# Patient Record
Sex: MALE | Race: BLACK OR AFRICAN AMERICAN | URBAN - METROPOLITAN AREA
[De-identification: names, ages, dates, MRNs, and addresses within clinical notes are randomized per-mention and may not be internally consistent; named-entity substitution may affect disease eponyms.]

---

## 2017-06-02 ENCOUNTER — OFFICE VISIT (OUTPATIENT)
Dept: FAMILY MEDICINE | Facility: CLINIC | Age: 22
End: 2017-06-02
Payer: COMMERCIAL

## 2017-06-02 VITALS
HEART RATE: 107 BPM | BODY MASS INDEX: 23.1 KG/M2 | SYSTOLIC BLOOD PRESSURE: 124 MMHG | WEIGHT: 180 LBS | OXYGEN SATURATION: 96 % | HEIGHT: 74 IN | DIASTOLIC BLOOD PRESSURE: 75 MMHG | TEMPERATURE: 97.9 F

## 2017-06-02 DIAGNOSIS — M67.40 GANGLION CYST: Primary | ICD-10-CM

## 2017-06-02 PROCEDURE — 99213 OFFICE O/P EST LOW 20 MIN: CPT | Performed by: NURSE PRACTITIONER

## 2017-06-02 NOTE — PROGRESS NOTES
"  SUBJECTIVE:                                                    Ismael Hussein is a 22 year old male who presents to clinic today for the following health issues:      Joint Pain/ knot on hand      Onset: 3-4 months     Description:   Location:dorsum  left wrist  Character: Sharp    Intensity: mild    Progression of Symptoms: better    Accompanying Signs & Symptoms:  Other symptoms: swelling   History:   Previous similar pain: no       Precipitating factors:   Trauma or overuse: no     Alleviating factors:  Improved by: nothing       Therapies Tried and outcome: None      Problem list and histories reviewed & adjusted, as indicated.  Additional history: as documented    BP Readings from Last 3 Encounters:   06/02/17 124/75   03/07/16 131/78   09/30/15 119/74    Wt Readings from Last 3 Encounters:   06/06/17 180 lb (81.6 kg)   06/02/17 180 lb (81.6 kg)   09/10/15 175 lb 4.8 oz (79.5 kg) (82 %)*     * Growth percentiles are based on CDC 2-20 Years data.                  Labs reviewed in EPIC    Reviewed and updated as needed this visit by clinical staff       Reviewed and updated as needed this visit by Provider         ROS:  Constitutional, HEENT, cardiovascular, pulmonary, gi and gu systems are negative, except as otherwise noted.    OBJECTIVE:                                                    /75 (BP Location: Left arm, Patient Position: Chair, Cuff Size: Adult Regular)  Pulse 107  Temp 97.9  F (36.6  C) (Oral)  Ht 6' 1.83\" (1.875 m)  Wt 180 lb (81.6 kg)  SpO2 96%  BMI 23.22 kg/m2  Body mass index is 23.22 kg/(m^2).  GENERAL: healthy, alert and no distress  NECK: no adenopathy, no asymmetry, masses, or scars and thyroid normal to palpation  RESP: lungs clear to auscultation - no rales, rhonchi or wheezes  CV: regular rate and rhythm, normal S1 S2, no S3 or S4, no murmur, click or rub, no peripheral edema and peripheral pulses strong  ABDOMEN: soft, nontender, no hepatosplenomegaly, no masses and bowel " "sounds normal  MS:left wrist- 1 cm nodular lesion on dorsum left wrist consistent with ganglion, wrist has FAROM, no point tenderness, normal radial and ulnar pulses, normal sensation distally, cap refill < 3 sec. Otherwise, no gross musculoskeletal defects noted, no edema  SKIN: no suspicious lesions or rashes  PSYCH: mentation appears normal, affect normal/bright    Diagnostic Test Results:  none      ASSESSMENT/PLAN:                                                        BP Screening:   Last 3 BP Readings:    BP Readings from Last 3 Encounters:   06/02/17 124/75   03/07/16 131/78   09/30/15 119/74       The following was recommended to the patient:  Re-screen BP within a year and recommended lifestyle modifications  BMI:   Estimated body mass index is 23.22 kg/(m^2) as calculated from the following:    Height as of this encounter: 6' 1.83\" (1.875 m).    Weight as of this encounter: 180 lb (81.6 kg).         1. Ganglion cyst  Referring to Orthopedics for further evaluation/management.  - ORTHO  REFERRAL    Patient declines HPV vaccine today- he'll think about it.  Also due for eye exam and he'll schedule this.  See Patient Instructions    ZEKE Bingham [facility]    "

## 2017-06-02 NOTE — MR AVS SNAPSHOT
After Visit Summary   6/2/2017    Ismael Hussein    MRN: [id number]           Patient Information     Date Of Birth          1995        Visit Information        Provider Department      6/2/2017 1:20 PM Rocío Moore APRN CNP [facility]        Today's Diagnoses     Ganglion cyst    -  1      Care Instructions    Based on your medical history and these are the current health maintenance or preventive care services that you are due for (some may have been done at this visit)  Health Maintenance Due   Topic Date Due     HPV IMMUNIZATION (1 of 3 - Male 3 Dose Series) 05/14/2008     EYE EXAM Q1 YEAR  08/22/2015         At [facility], we strive to deliver an exceptional experience to you, every time we see you.    If you receive a survey in the mail, please send us back your thoughts. We really do value your feedback.    Your care team's suggested websites for health information:  Www.[employer].NewsCred : Up to date and easily searchable information on multiple topics.  Www.medlineplus.gov : medication info, interactive tutorials, watch real surgeries online  Www.familydoctor.org : good info from the Academy of Family Physicians  Www.cdc.gov : public health info, travel advisories, epidemics (H1N1)  Www.aap.org : children's health info, normal development, vaccinations  Www.health.[facility].[email or web address] : [place] dept of health, public health issues in [place], N1N1    How to contact your care team:   Team Viry/Spirit [phone]         Pharmacy [phone]    Dr. Rowley, Lakshmi Katz PA-C, Dr. Haywood, Jazmyne ALANIS CNP, Almaz Ling PA-C, Dr. Henderson, and ZEKE Lewis CNP    Team RNs: Suad & Radha      Clinic hours  M-Th 7 am-7 pm   Fri 7 am-5 pm.   Urgent care M-F 11 am-9 pm,   Sat/Sun 9 am-5 pm.  Pharmacy M-Th 8 am-8 pm Fri 8 am-6 pm  Sat/Sun 9 am-5 pm.     All password changes, disabled accounts, or ID changes in MyChart/MyHealth will be done by  our Access Services Department.    If you need help with your account or password, call: [phone]. Clinic staff no longer has the ability to change passwords.     Ganglion Cyst: Hand  A ganglion cyst is a firm, fluid-filled lump that can suddenly appear on the front or back of the wrist or at the base of a finger. These cysts grow from normal tissue in the wrist and fingers, and range in size from a pea to a peach pit. Although ganglion cysts are common, they don t spread, and they don t become cancerous. They can occur after an injury, but many times it isn t known why they grow. Ganglion cysts can change in size, and may go away on their own.  Symptoms  A ganglion cyst is sometimes painful, especially when it first occurs. Constantly using your hand or wrist can make the cyst enlarge and hurt more. Some hand and wrist movements, such as grasping things, may also be difficult.  How a ganglion cyst develops  Your wrist and hand are made up of many small bones that meet at joints. Tendons attach muscles to the bones at the joints. The tendons allow the joints to bend and straighten. Both tendons and joints are lined with tissue called synovium. This tissue makes a thick fluid that keeps the joints and tendons moving easily. Sometimes the tissue balloons out from the joint or tendons and forms a cyst. As the cyst fills with fluid and grows, it appears as a lump you can feel.  Where ganglion cysts occur  A ganglion cyst can occur anywhere on the hand near a joint. Cysts most commonly appear on the back or palm side of the wrist, or on the palm at the base of a finger. Your doctor can usually diagnose a cyst by examining the lump. He or she may draw off a little fluid or order an X-ray to rule out other problems.  Treating a ganglion cyst  Your healthcare provider may just watch your ganglion cyst. Many shrink and become painless without treatment. Some disappear altogether. If the cyst is unsightly or painful, or  makes it hard for you to use your hand, your healthcare provider can treat it or, if needed, remove it surgically.    Nonsurgical treatment  To shrink the cyst, your provider may remove (aspirate) the fluid with a needle. If the cyst hurts, your provider may also give you an injection of an anti-inflammatory, such as cortisone, to relieve the irritation. Your hand may then be wrapped to help keep the cyst from recurring.  Surgery  If the cyst reappears after treatment, your healthcare provider may remove it surgically. A section of the tissue that lines the joint or tendon is removed along with the cyst. This helps prevent another cyst from forming, although recurrence of the cyst is still possible after surgery. Usually, only your hand or arm is numbed, and you can go home a few hours after surgery. Your hand may be in a splint for several days.    6106-4717 [employer] (vitals.com). [address] [place] [address]. All rights reserved. This information is not intended as a substitute for professional medical care. Always follow your healthcare professional's instructions.        Ganglion Cyst    A ganglion cyst usually is a painless bump on the wrist or finger joint. It connects to the joint capsule and grows like a balloon on a stalk. It is filled with joint fluid. The cause of a ganglion cyst is not known.   If the cyst puts pressure on a nearby nerve it may cause pain. Otherwise, cysts are painless and harmless. Most tend to disappear over time without treatment. Do not try to drain or break the cyst at home. This can cause harm and does not cure the problem.  If you are having pain from the cyst, a temporary wrist splint may be helpful to limit wrist motion. If this does not help, the fluid can be removed from the cyst. This should shrink the size of the cyst. If this doesn t give relief, the ganglion can be removed by surgery.  Home care    If you are having wrist pain, use a wrist splint for 1-2  weeks at a time. You can buy one at many drug stores without a prescription.    You may use over-the-counter pain medicine to control pain, unless another medicine was prescribed. If you have chronic liver or kidney disease or ever had a stomach ulcer or GI bleeding, talk with your healthcare provider before using these medicines.      If a needle was used to drain the cyst fluid or inject medicine into it, keep the site clean and covered with a bandage for the first 24 hours. If a pressure dressing was applied, leave it in place for the time advised.  Follow-up care  Follow up with your healthcare provider, or as advised by our staff. Make an appointment for a repeat exam if pain continues for more than 2 weeks in a wrist splint.  When to seek medical advice  Call your healthcare provider right away if any of these occur:    Increasing pain in the wrist    Redness over the cyst    Fluid draining from the cyst    Numbness or tingling in the hand or arm    3775-9085 [employer]. [address] [place] [address]. All rights reserved. This information is not intended as a substitute for professional medical care. Always follow your healthcare professional's instructions.                Follow-ups after your visit        Additional Services     ORTHO  REFERRAL       [facility] is referring you to the Orthopedic  Services at [facility].       The  Representative will assist you in the coordination of your Orthopedic and Musculoskeletal Care as prescribed by your physician.    The  Representative will call you within 1 business day to help schedule your appointment, or you may contact the  Representative at:    All areas ~ [phone]     Type of Referral : Surgical / Specialist       Timeframe requested: Routine    Coverage of these services is subject to the terms and limitations of your health insurance plan.   "Please call member services at your health plan with any benefit or coverage questions.      If X-rays, CT or MRI's have been performed, please contact the facility where they were done to arrange for , prior to your scheduled appointment.  Please bring this referral request to your appointment and present it to your specialist.                  Who to contact     If you have questions or need follow up information about today's clinic visit or your schedule please contact [facility] directly at [phone].  Normal or non-critical lab and imaging results will be communicated to you by MyChart, letter or phone within 4 business days after the clinic has received the results. If you do not hear from us within 7 days, please contact the clinic through [employer] or phone. If you have a critical or abnormal lab result, we will notify you by phone as soon as possible.  Submit refill requests through [employer] or call your pharmacy and they will forward the refill request to us. Please allow 3 business days for your refill to be completed.          Additional Information About Your Visit        [employer] Information     [employer] lets you send messages to your doctor, view your test results, renew your prescriptions, schedule appointments and more. To sign up, go to [email or web address] . Click on \"Log in\" on the left side of the screen, which will take you to the Welcome page. Then click on \"Sign up Now\" on the right side of the page.     You will be asked to enter the access code listed below, as well as some personal information. Please follow the directions to create your username and password.     Your access code is: [email or web address]  Expires: 8/3/2017  3:22 PM     Your access code will  in 90 days. If you need help or a new code, please call your [facility] or [phone].        Care EveryWhere ID     This is your Care EveryWhere ID. This could be used by other organizations to " "access your [place] medical records  [id number]        Your Vitals Were     Pulse Temperature Height Pulse Oximetry BMI (Body Mass Index)       107 97.9  F (36.6  C) (Oral) 6' 1.83\" (1.875 m) 96% 23.22 kg/m2        Blood Pressure from Last 3 Encounters:   06/02/17 124/75   03/07/16 131/78   09/30/15 119/74    Weight from Last 3 Encounters:   06/02/17 180 lb (81.6 kg)   09/10/15 175 lb 4.8 oz (79.5 kg) (82 %)*   02/19/15 169 lb 1.6 oz (76.7 kg) (79 %)*     * Growth percentiles are based on CDC 2-20 Years data.              We Performed the Following     ORTHO  REFERRAL        Primary Care Provider Office Phone # Fax #    Magdiel Barlow [phone]       IN[facility] [address] [facility] [address]  [facility] [address]        Thank you!     Thank you for choosing [facility]  for your care. Our goal is always to provide you with excellent care. Hearing back from our patients is one way we can continue to improve our services. Please take a few minutes to complete the written survey that you may receive in the mail after your visit with us. Thank you!             Your Updated Medication List - Protect others around you: Learn how to safely use, store and throw away your medicines at www.disposemymeds.org.          This list is accurate as of: 6/2/17  1:56 PM.  Always use your most recent med list.                   Brand Name Dispense Instructions for use    ibuprofen 400 MG tablet    ADVIL/MOTRIN    45 tablet    Take 1 tablet (400 mg) by mouth every 6 hours as needed for moderate pain       order for DME     1 Device    Equipment being ordered: DON [id number] large walking boot         "

## 2017-06-02 NOTE — NURSING NOTE
"Chief Complaint   Patient presents with     Derm Problem       Initial /75 (BP Location: Left arm, Patient Position: Chair, Cuff Size: Adult Regular)  Pulse 107  Temp 97.9  F (36.6  C) (Oral)  Ht 6' 1.83\" (1.875 m)  Wt 180 lb (81.6 kg)  SpO2 96%  BMI 23.22 kg/m2 Estimated body mass index is 23.22 kg/(m^2) as calculated from the following:    Height as of this encounter: 6' 1.83\" (1.875 m).    Weight as of this encounter: 180 lb (81.6 kg).  Medication Reconciliation: complete   Moira Hunter MA      "

## 2017-06-02 NOTE — PATIENT INSTRUCTIONS
Based on your medical history and these are the current health maintenance or preventive care services that you are due for (some may have been done at this visit)  Health Maintenance Due   Topic Date Due     HPV IMMUNIZATION (1 of 3 - Male 3 Dose Series) 05/14/2008     EYE EXAM Q1 YEAR  08/22/2015         At [facility], we strive to deliver an exceptional experience to you, every time we see you.    If you receive a survey in the mail, please send us back your thoughts. We really do value your feedback.    Your care team's suggested websites for health information:  Www.[employer].org : Up to date and easily searchable information on multiple topics.  Www.medlineplus.gov : medication info, interactive tutorials, watch real surgeries online  Www.familydoctor.org : good info from the Academy of Family Physicians  Www.cdc.gov : public health info, travel advisories, epidemics (H1N1)  Www.aap.org : children's health info, normal development, vaccinations  Www.health.[facility].[place].us : [place] dept of health, public health issues in [place], N1N1    How to contact your care team:   Team Viry/Jamaal [phone]         Pharmacy [phone]    Dr. Rowley, Lakshmi Katz PA-C, Dr. Haywood, Jazmyne ALANIS CNP, Almaz Ling PA-C, Dr. Henderson, and ZEKE Lewis CNP    Team RNs: Suad & Radha      Clinic hours  M-Th 7 am-7 pm   Fri 7 am-5 pm.   Urgent care M-F 11 am-9 pm,   Sat/Sun 9 am-5 pm.  Pharmacy M-Th 8 am-8 pm Fri 8 am-6 pm  Sat/Sun 9 am-5 pm.     All password changes, disabled accounts, or ID changes in Rapid7/MyHealth will be done by our Access Services Department.    If you need help with your account or password, call: [phone]. Clinic staff no longer has the ability to change passwords.     Ganglion Cyst: Hand  A ganglion cyst is a firm, fluid-filled lump that can suddenly appear on the front or back of the wrist or at the base of a finger. These cysts grow from normal  tissue in the wrist and fingers, and range in size from a pea to a peach pit. Although ganglion cysts are common, they don t spread, and they don t become cancerous. They can occur after an injury, but many times it isn t known why they grow. Ganglion cysts can change in size, and may go away on their own.  Symptoms  A ganglion cyst is sometimes painful, especially when it first occurs. Constantly using your hand or wrist can make the cyst enlarge and hurt more. Some hand and wrist movements, such as grasping things, may also be difficult.  How a ganglion cyst develops  Your wrist and hand are made up of many small bones that meet at joints. Tendons attach muscles to the bones at the joints. The tendons allow the joints to bend and straighten. Both tendons and joints are lined with tissue called synovium. This tissue makes a thick fluid that keeps the joints and tendons moving easily. Sometimes the tissue balloons out from the joint or tendons and forms a cyst. As the cyst fills with fluid and grows, it appears as a lump you can feel.  Where ganglion cysts occur  A ganglion cyst can occur anywhere on the hand near a joint. Cysts most commonly appear on the back or palm side of the wrist, or on the palm at the base of a finger. Your doctor can usually diagnose a cyst by examining the lump. He or she may draw off a little fluid or order an X-ray to rule out other problems.  Treating a ganglion cyst  Your healthcare provider may just watch your ganglion cyst. Many shrink and become painless without treatment. Some disappear altogether. If the cyst is unsightly or painful, or makes it hard for you to use your hand, your healthcare provider can treat it or, if needed, remove it surgically.    Nonsurgical treatment  To shrink the cyst, your provider may remove (aspirate) the fluid with a needle. If the cyst hurts, your provider may also give you an injection of an anti-inflammatory, such as cortisone, to relieve the  irritation. Your hand may then be wrapped to help keep the cyst from recurring.  Surgery  If the cyst reappears after treatment, your healthcare provider may remove it surgically. A section of the tissue that lines the joint or tendon is removed along with the cyst. This helps prevent another cyst from forming, although recurrence of the cyst is still possible after surgery. Usually, only your hand or arm is numbed, and you can go home a few hours after surgery. Your hand may be in a splint for several days.    3428-0610 [employer]. [address] [place] [address]. All rights reserved. This information is not intended as a substitute for professional medical care. Always follow your healthcare professional's instructions.        Ganglion Cyst    A ganglion cyst usually is a painless bump on the wrist or finger joint. It connects to the joint capsule and grows like a balloon on a stalk. It is filled with joint fluid. The cause of a ganglion cyst is not known.   If the cyst puts pressure on a nearby nerve it may cause pain. Otherwise, cysts are painless and harmless. Most tend to disappear over time without treatment. Do not try to drain or break the cyst at home. This can cause harm and does not cure the problem.  If you are having pain from the cyst, a temporary wrist splint may be helpful to limit wrist motion. If this does not help, the fluid can be removed from the cyst. This should shrink the size of the cyst. If this doesn t give relief, the ganglion can be removed by surgery.  Home care    If you are having wrist pain, use a wrist splint for 1-2 weeks at a time. You can buy one at many drug stores without a prescription.    You may use over-the-counter pain medicine to control pain, unless another medicine was prescribed. If you have chronic liver or kidney disease or ever had a stomach ulcer or GI bleeding, talk with your healthcare provider before using these medicines.      If a  needle was used to drain the cyst fluid or inject medicine into it, keep the site clean and covered with a bandage for the first 24 hours. If a pressure dressing was applied, leave it in place for the time advised.  Follow-up care  Follow up with your healthcare provider, or as advised by our staff. Make an appointment for a repeat exam if pain continues for more than 2 weeks in a wrist splint.  When to seek medical advice  Call your healthcare provider right away if any of these occur:    Increasing pain in the wrist    Redness over the cyst    Fluid draining from the cyst    Numbness or tingling in the hand or arm    6528-8223 [employer]. [address] [place] [address], [place] [address]. All rights reserved. This information is not intended as a substitute for professional medical care. Always follow your healthcare professional's instructions.

## 2017-06-06 ENCOUNTER — OFFICE VISIT (OUTPATIENT)
Dept: ORTHOPEDICS | Facility: CLINIC | Age: 22
End: 2017-06-06
Payer: COMMERCIAL

## 2017-06-06 VITALS — WEIGHT: 180 LBS | TEMPERATURE: 97.1 F | BODY MASS INDEX: 23.1 KG/M2 | RESPIRATION RATE: 18 BRPM | HEIGHT: 74 IN

## 2017-06-06 DIAGNOSIS — M67.432 GANGLION CYST OF WRIST, LEFT: Primary | ICD-10-CM

## 2017-06-06 PROCEDURE — 99242 OFF/OP CONSLTJ NEW/EST SF 20: CPT | Mod: 25 | Performed by: ORTHOPAEDIC SURGERY

## 2017-06-06 PROCEDURE — 20612 ASPIRATE/INJ GANGLION CYST: CPT | Mod: LT | Performed by: ORTHOPAEDIC SURGERY

## 2017-06-06 RX ORDER — TRIAMCINOLONE ACETONIDE 40 MG/ML
20 INJECTION, SUSPENSION INTRA-ARTICULAR; INTRAMUSCULAR ONCE
Qty: 0.5 ML | Refills: 0 | OUTPATIENT
Start: 2017-06-06 | End: 2017-06-06

## 2017-06-06 NOTE — PROGRESS NOTES
Ismael Hussein is a 22 year old male who is seen in consultation at the request of Rocío Moore CNP for evaluation of a mass located dorsal left wrist    It has been present for 4 months.    Symptoms include swelling.  Previous treatment:  None  He is left hand dominant    History reviewed. No pertinent past medical history.    History reviewed. No pertinent surgical history.    Family History   Problem Relation Age of Onset     Hypertension Maternal Grandfather      CANCER No family hx of      DIABETES No family hx of      CEREBROVASCULAR DISEASE No family hx of      Thyroid Disease No family hx of      Glaucoma No family hx of      Macular Degeneration No family hx of        Social History     Social History     Marital status: Single     Spouse name: N/A     Number of children: N/A     Years of education: N/A     Occupational History     Not on file.     Social History Main Topics     Smoking status: Never Smoker     Smokeless tobacco: Never Used     Alcohol use No     Drug use: No     Sexual activity: No     Other Topics Concern     Not on file     Social History Narrative       Current Outpatient Prescriptions   Medication Sig Dispense Refill     order for DME Equipment being ordered: DON [id number] large walking boot 1 Device 0     ibuprofen (ADVIL,MOTRIN) 400 MG tablet Take 1 tablet (400 mg) by mouth every 6 hours as needed for moderate pain 45 tablet 1       No Known Allergies    REVIEW OF SYSTEMS:  CONSTITUTIONAL:  NEGATIVE for fever, chills, change in weight, not feeling tired  SKIN:  NEGATIVE for worrisome rashes, no skin lumps, no skin ulcers and no non-healing wounds  EYES:  NEGATIVE for vision changes or irritation.  ENT/MOUTH:  NEGATIVE.  No hearing loss, no hoarseness, no difficulty swallowing.  RESP:  NEGATIVE. No cough or shortness of breath.  BREAST:  NEGATIVE for masses, tenderness or discharge  CV:  NEGATIVE for chest pain, palpitations or peripheral edema  GI:  NEGATIVE for nausea, abdominal  "pain, heartburn, or change in bowel habits  :  Negative. No dysuria, no hematuria  MUSCULOSKELETAL:  See HPI above  NEURO:  NEGATIVE . No headaches, no dizziness,  no numbness  ENDOCRINE:  NEGATIVE for temperature intolerance, skin/hair changes  HEME/ALLERGY/IMMUNE:  NEGATIVE for bleeding problems  PSYCHIATRIC:  NEGATIVE. no anxiety, no depression.         OBJECTIVE:    Vitals: Temp 97.1  F (36.2  C)  Resp 18  Ht 1.88 m (6' 2\")  Wt 81.6 kg (180 lb)  BMI 23.11 kg/m2  BMI= Body mass index is 23.11 kg/(m^2).       Exam:  2 cm  x  2 cm mass is seen located dorsal left wrist.  Tender at cyst.  No warmth or erythema.  Full range of motion of hand.  Sensation, motor, and circulation intact    ASSESSMENT:  Left dorsal ganglion cyst.    PLAN:  Risks, benefits, potential complications and alternatives were discussed.  He wishes aspiration/ injection.  After sterile prep, I infiltrated 1% lidocaine.  Aspiration was performed with removal of 3.5 cc of gelatinous fluid.  Injection with 20 mg kenolog.  Tubigrip applied..       "

## 2017-06-06 NOTE — MR AVS SNAPSHOT
"              After Visit Summary   2017    Ismael Hussein    MRN: [id number]           Patient Information     Date Of Birth          1995        Visit Information        Provider Department      2017 10:45 AM Ivan Marc MD [facility]        Today's Diagnoses     Ganglion cyst of wrist, left    -  1       Follow-ups after your visit        Who to contact     If you have questions or need follow up information about today's clinic visit or your schedule please contact [facility] directly at [phone].  Normal or non-critical lab and imaging results will be communicated to you by [employer], letter or phone within 4 business days after the clinic has received the results. If you do not hear from us within 7 days, please contact the clinic through [employer] or phone. If you have a critical or abnormal lab result, we will notify you by phone as soon as possible.  Submit refill requests through [employer] or call your pharmacy and they will forward the refill request to us. Please allow 3 business days for your refill to be completed.          Additional Information About Your Visit        MyChart Information     [employer] lets you send messages to your doctor, view your test results, renew your prescriptions, schedule appointments and more. To sign up, go to [email or web address] . Click on \"Log in\" on the left side of the screen, which will take you to the Welcome page. Then click on \"Sign up Now\" on the right side of the page.     You will be asked to enter the access code listed below, as well as some personal information. Please follow the directions to create your username and password.     Your access code is: [email or web address]  Expires: 8/3/2017  3:22 PM     Your access code will  in 90 days. If you need help or a new code, please call your [facility] or [phone].        Care EveryWhere ID     This is your Care EveryWhere ID. This could be used " "by other organizations to access your [place] medical records  [id number]        Your Vitals Were     Temperature Respirations Height BMI (Body Mass Index)          97.1  F (36.2  C) 18 1.88 m (6' 2\") 23.11 kg/m2         Blood Pressure from Last 3 Encounters:   06/02/17 124/75   03/07/16 131/78   09/30/15 119/74    Weight from Last 3 Encounters:   06/06/17 81.6 kg (180 lb)   06/02/17 81.6 kg (180 lb)   09/10/15 79.5 kg (175 lb 4.8 oz) (82 %)*     * Growth percentiles are based on [facility] 2-20 Years data.              We Performed the Following     ASPIRATION &/OR INJECTION GANGLION CYST, ANY     TRIAMCINOLONE ACET INJ NOS          Today's Medication Changes          These changes are accurate as of: 6/6/17 11:36 AM.  If you have any questions, ask your nurse or doctor.               Start taking these medicines.        Dose/Directions    triamcinolone acetonide 40 MG/ML injection   Commonly known as:  KENALOG   Used for:  Ganglion cyst of wrist, left   Started by:  Ivan Marc MD        Dose:  20 mg   0.5 mLs (20 mg) by INTRA-ARTICULAR route once for 1 dose   Quantity:  0.5 mL   Refills:  0            Where to get your medicines      Some of these will need a paper prescription and others can be bought over the counter.  Ask your nurse if you have questions.     You don't need a prescription for these medications     triamcinolone acetonide 40 MG/ML injection                Primary Care Provider Office Phone # Fax #    Magdiel Barlow [phone]       [facility] [address] [address]        Thank you!     Thank you for choosing [facility]  for your care. Our goal is always to provide you with excellent care. Hearing back from our patients is one way we can continue to improve our services. Please take a few minutes to complete the written survey that you may receive in the mail after your visit with us. Thank you!             Your " Updated Medication List - Protect others around you: Learn how to safely use, store and throw away your medicines at www.disposemymeds.org.          This list is accurate as of: 6/6/17 11:36 AM.  Always use your most recent med list.                   Brand Name Dispense Instructions for use    ibuprofen 400 MG tablet    ADVIL/MOTRIN    45 tablet    Take 1 tablet (400 mg) by mouth every 6 hours as needed for moderate pain       order for DME     1 Device    Equipment being ordered: DON [id number] large walking boot       triamcinolone acetonide 40 MG/ML injection    KENALOG    0.5 mL    0.5 mLs (20 mg) by INTRA-ARTICULAR route once for 1 dose

## 2017-06-06 NOTE — NURSING NOTE
"Chief Complaint   Patient presents with     Consult     Left wrist ganglion cyst for the last 3-4 mo. Pain level 4/10 sharp and occasional. Nothing helps the pain and bumping the hand makes the pain worse.       Initial Temp 97.1  F (36.2  C)  Resp 18  Ht 1.88 m (6' 2\")  Wt 81.6 kg (180 lb)  BMI 23.11 kg/m2 Estimated body mass index is 23.11 kg/(m^2) as calculated from the following:    Height as of this encounter: 1.88 m (6' 2\").    Weight as of this encounter: 81.6 kg (180 lb).  Medication Reconciliation: complete Mary Jane Krishnan MA      "

## 2017-06-06 NOTE — LETTER
6/6/2017       RE: Ismael Hussein  [address] [address]           Dear Colleague,    Thank you for referring your patient, Ismael Hussein, to the [facility]. Please see a copy of my visit note below.    Ismael Hussein is a 22 year old male who is seen in consultation at the request of Rocío Moore CNP for evaluation of a mass located dorsal left wrist    It has been present for 4 months.    Symptoms include swelling.  Previous treatment:  None  He is left hand dominant    History reviewed. No pertinent past medical history.    History reviewed. No pertinent surgical history.    Family History   Problem Relation Age of Onset     Hypertension Maternal Grandfather      CANCER No family hx of      DIABETES No family hx of      CEREBROVASCULAR DISEASE No family hx of      Thyroid Disease No family hx of      Glaucoma No family hx of      Macular Degeneration No family hx of        Social History     Social History     Marital status: Single     Spouse name: N/A     Number of children: N/A     Years of education: N/A     Occupational History     Not on file.     Social History Main Topics     Smoking status: Never Smoker     Smokeless tobacco: Never Used     Alcohol use No     Drug use: No     Sexual activity: No     Other Topics Concern     Not on file     Social History Narrative       Current Outpatient Prescriptions   Medication Sig Dispense Refill     order for DME Equipment being ordered: DON [id number] large walking boot 1 Device 0     ibuprofen (ADVIL,MOTRIN) 400 MG tablet Take 1 tablet (400 mg) by mouth every 6 hours as needed for moderate pain 45 tablet 1       No Known Allergies    REVIEW OF SYSTEMS:  CONSTITUTIONAL:  NEGATIVE for fever, chills, change in weight, not feeling tired  SKIN:  NEGATIVE for worrisome rashes, no skin lumps, no skin ulcers and no non-healing wounds  EYES:  NEGATIVE for vision changes or irritation.  ENT/MOUTH:  NEGATIVE.  No hearing loss, no  "hoarseness, no difficulty swallowing.  RESP:  NEGATIVE. No cough or shortness of breath.  BREAST:  NEGATIVE for masses, tenderness or discharge  CV:  NEGATIVE for chest pain, palpitations or peripheral edema  GI:  NEGATIVE for nausea, abdominal pain, heartburn, or change in bowel habits  :  Negative. No dysuria, no hematuria  MUSCULOSKELETAL:  See HPI above  NEURO:  NEGATIVE . No headaches, no dizziness,  no numbness  ENDOCRINE:  NEGATIVE for temperature intolerance, skin/hair changes  HEME/ALLERGY/IMMUNE:  NEGATIVE for bleeding problems  PSYCHIATRIC:  NEGATIVE. no anxiety, no depression.         OBJECTIVE:    Vitals: Temp 97.1  F (36.2  C)  Resp 18  Ht 1.88 m (6' 2\")  Wt 81.6 kg (180 lb)  BMI 23.11 kg/m2  BMI= Body mass index is 23.11 kg/(m^2).       Exam:  2 cm  x  2 cm mass is seen located dorsal left wrist.  Tender at cyst.  No warmth or erythema.  Full range of motion of hand.  Sensation, motor, and circulation intact    ASSESSMENT:  Left dorsal ganglion cyst.    PLAN:  Risks, benefits, potential complications and alternatives were discussed.  He wishes aspiration/ injection.  After sterile prep, I infiltrated 1% lidocaine.  Aspiration was performed with removal of 3.5 cc of gelatinous fluid.  Injection with 20 mg kenolog.  Tubigrip applied..         The patient's left wrist was prepped with betadine solution after verification of allergies. Area approximately 10 cm x 10 cm prepped in a sterile fashion. After injection, betadine removed with soap and water and band-aids applied.    0.5ml kenalog injected into patient's left wrist by Dr. Ivan Marc  LOT# [id number]  Exp. 06/2018    Ayaan Rod PA-C  Supervising physician: Ivan Marc MD  Dept. of Orthopedics  [facility]          Again, thank you for allowing me to participate in the care of your patient.        Sincerely,              Ivan Marc MD    "

## 2017-06-06 NOTE — PROGRESS NOTES
The patient's left wrist was prepped with betadine solution after verification of allergies. Area approximately 10 cm x 10 cm prepped in a sterile fashion. After injection, betadine removed with soap and water and band-aids applied.    0.5ml kenalog injected into patient's left wrist by Dr. Ivan Marc  LOT# [id number]  Exp. 06/2018    OVIDIO BravoC  Supervising physician: Ivan Marc MD  Dept. of Orthopedics  [facility]